# Patient Record
Sex: MALE | Race: BLACK OR AFRICAN AMERICAN | Employment: OTHER | URBAN - METROPOLITAN AREA
[De-identification: names, ages, dates, MRNs, and addresses within clinical notes are randomized per-mention and may not be internally consistent; named-entity substitution may affect disease eponyms.]

---
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
## 2025-06-10 ENCOUNTER — OFFICE VISIT (OUTPATIENT)
Age: 20
End: 2025-06-10

## 2025-06-10 VITALS
SYSTOLIC BLOOD PRESSURE: 105 MMHG | OXYGEN SATURATION: 99 % | HEART RATE: 60 BPM | WEIGHT: 191.2 LBS | DIASTOLIC BLOOD PRESSURE: 66 MMHG | RESPIRATION RATE: 16 BRPM | BODY MASS INDEX: 27.37 KG/M2 | HEIGHT: 70 IN | TEMPERATURE: 98.3 F

## 2025-06-10 DIAGNOSIS — S05.01XA ABRASION OF RIGHT CORNEA, INITIAL ENCOUNTER: Primary | ICD-10-CM

## 2025-06-10 RX ORDER — ERYTHROMYCIN 5 MG/G
OINTMENT OPHTHALMIC
Qty: 1 EACH | Refills: 0 | Status: SHIPPED | OUTPATIENT
Start: 2025-06-10

## 2025-06-10 NOTE — PATIENT INSTRUCTIONS
Corneal abrasion to the right eye -  Erythromycin ointment, apply a thin layer to the lower lid 4 times daily for 7 days  Recommend no contact lens use in that time  Recommend follow-up with your optometrist/ophthalmologist  If any significant worsening go to the nearest emergency room  Call or return to clinic if any concerns

## 2025-06-10 NOTE — PROGRESS NOTES
Jamil Dunbar (:  2005) is a 19 y.o. male,New patient, here for evaluation of the following chief complaint(s):  Eye Problem (Pt presents to clinic w/ C/o of scratch to RT eye. Reports injury occurred 06/10/25 when his little sister scratched his RT eye, states it is painful to blink w/ blurry vision. Reports OTC usage to include lubricating eye drops.)        SUBJECTIVE/OBJECTIVE:    History provided by:  Patient  Eye Problem          19 y.o. male presents with symptoms of corneal patient sustained earlier today.  Patient states he was playing with his 7-year-old little sister when she accidentally scratched his right eye with her fingernail.  He states initially he had some photophobia but that seems better.  Feels like sandpaper in his eye.  Admits to some watering.  He denies any purulent drainage.  He admits to some blurriness.  He denies any fevers or chills.  He wears contacts but is not wearing them currently.  He has been using some over-the-counter lubricating eyedrops.         Vitals:    06/10/25 1559   BP: 105/66   BP Site: Right Upper Arm   Patient Position: Sitting   BP Cuff Size: Large Adult   Pulse: 60   Resp: 16   Temp: 98.3 °F (36.8 °C)   TempSrc: Oral   SpO2: 99%   Weight: 86.7 kg (191 lb 3.2 oz)   Height: 1.778 m (5' 10\")       No results found for this visit on 06/10/25.     Physical Exam  Constitutional:       General: He is not in acute distress.     Appearance: Normal appearance. He is not ill-appearing or toxic-appearing.   HENT:      Head: Normocephalic and atraumatic.   Eyes:      Extraocular Movements: Extraocular movements intact.      Conjunctiva/sclera:      Right eye: Right conjunctiva is injected. No chemosis, exudate or hemorrhage.     Pupils: Pupils are equal, round, and reactive to light.      Right eye: Corneal abrasion and fluorescein uptake present.      Comments: The right eye has watery drainage.  The sclera is injected.  No purulent drainage.  On fluorescein